# Patient Record
Sex: FEMALE | Race: WHITE
[De-identification: names, ages, dates, MRNs, and addresses within clinical notes are randomized per-mention and may not be internally consistent; named-entity substitution may affect disease eponyms.]

---

## 2018-06-19 ENCOUNTER — HOSPITAL ENCOUNTER (EMERGENCY)
Dept: HOSPITAL 80 - FED | Age: 28
Discharge: HOME | End: 2018-06-19
Payer: COMMERCIAL

## 2018-06-19 VITALS — DIASTOLIC BLOOD PRESSURE: 61 MMHG | SYSTOLIC BLOOD PRESSURE: 106 MMHG

## 2018-06-19 DIAGNOSIS — Z91.040: ICD-10-CM

## 2018-06-19 DIAGNOSIS — G44.201: Primary | ICD-10-CM

## 2018-06-19 LAB — PLATELET # BLD: 278 10^3/UL (ref 150–400)

## 2018-06-19 NOTE — EDPHY
H & P


Stated Complaint: ha > 1 week/hx tension ha's


Time Seen by Provider: 06/19/18 15:12


HPI/ROS: 





CHIEF COMPLAINT:  Persistent headache





HISTORY OF PRESENT ILLNESS:  27-year-old female presents with a one-week 

history of persistent headache.  Onset of typical tension-type headache 1 week 

ago.  The pain started in her upper back and neck and then migrated to the back 

of her skull.  The headache has been persistent despite ibuprofen, Naprosyn and 

Tylenol.  She saw her PCP and was given Toradol IM with some relief.  She was 

sent here for CT scan.  h/o frequent tension HA's, similar in quality to this HA

, but never lasting this long.    Recent stressors at work.





REVIEW OF SYSTEMS:  complete 10 point ROS negative except at noted in the HPI








- Personal History


LMP (Females 10-55): Over 28 Days Ago


Current Tetanus Diphtheria and Acellular Pertussis (TDAP): Yes





- Medical/Surgical History


Hx Asthma: No


Hx Chronic Respiratory Disease: No


Hx Diabetes: No


Hx Cardiac Disease: No


Hx Renal Disease: No


Hx Cirrhosis: No


Hx Alcoholism: No


Hx HIV/AIDS: No


Hx Splenectomy or Spleen Trauma: No


Other PMH: ha





- Social History


Smoking Status: Never smoked


Alcohol Use: Occasionally


Drug Use: None


Additional Social History: 











- Physical Exam


Exam: 





General Appearance:  Alert, pleasant


Eyes:  Pupils equal and round, no conjunctival pallor or injection


ENT, Mouth:  Mucous membranes moist


Neck:  Normal inspection


Respiratory:  Lungs are clear to auscultation


Cardiovascular:  Regular rate and rhythm


Gastrointestinal:  Abdomen is soft and nontender


Neurological:  Alert, oriented x3, cranial nerves II through XII intact, motor 5

/5, sensory intact to light touch, normal gait


Skin:  Warm and dry, no rash


Extremities:  Nontender, no pedal edema


Psychiatric:  Mood and affect normal





Constitutional: 


 Initial Vital Signs











Temperature (C)  36.8 C   06/19/18 12:55


 


Heart Rate  68   06/19/18 12:55


 


Respiratory Rate  18   06/19/18 12:55


 


Blood Pressure  102/65   06/19/18 12:55


 


O2 Sat (%)  98   06/19/18 12:55








 











O2 Delivery Mode               Room Air














Allergies/Adverse Reactions: 


 





latex sensitive Allergy (Uncoded 06/19/18 12:58)


 








Home Medications: 














 Medication  Instructions  Recorded


 


CLONAZEPAM  06/19/18


 


Namenda 10 mg  06/19/18


 


Nuva Ring  06/19/18


 


Propranolol HCl  06/19/18


 


traZODone  06/19/18














Medical Decision Making





- Diagnostics


Imaging Results: 


 Imaging Impressions





Head CT  06/19/18 15:25


Impression:


 


No evidence of acute intracranial pathology.


 


Findings were communicated by telephone with Dr. MARTINEZ MARI at  6/19/2018 16

:17











ED Course/Re-evaluation: 





This patient presents with gradual onset of head and neck pain.  No relief with 

over-the-counter medications.  Neurologic exam is normal.  The clinical history 

does not suspect a serious etiology for the headache.  Specifically, I do not 

suspect meningitis or subarachnoid hemorrhage.  A CT scan of the brain ordered 

at her physician's request and is unremarkable.  Decadron, Benadryl and Reglan 

IV given.  The patient developed restless legs after IV Reglan.  Ativan 0.5 mg 

IV and Benadryl 12.5 mg IV given.  After this, she was no longer restless.  

Options discussed with the patient, including further medications or testing.  

She feels better and wishes to go home.  She appears comfortable and neurologic 

exam remains normal.  She will follow up with a neurologist as an outpatient.


Differential Diagnosis: 





Headache including but not limited to subarachnoid hemorrhage, migraine headache

, tension headache and infectious causes such as meningitis, pharyngitis and 

sinusitis.





- Data Points


Laboratory Results: 


 Laboratory Results





 06/19/18 15:32 





 06/19/18 15:32 





 











  06/19/18 06/19/18 06/19/18





  15:32 15:32 15:32


 


WBC      6.07 10^3/uL 10^3/uL





     (3.80-9.50) 


 


RBC      4.33 10^6/uL 10^6/uL





     (4.18-5.33) 


 


Hgb      13.7 g/dL g/dL





     (12.6-16.3) 


 


Hct      39.8 % %





     (38.0-47.0) 


 


MCV      91.9 fL fL





     (81.5-99.8) 


 


MCH      31.6 pg pg





     (27.9-34.1) 


 


MCHC      34.4 g/dL g/dL





     (32.4-36.7) 


 


RDW      12.8 % %





     (11.5-15.2) 


 


Plt Count      278 10^3/uL 10^3/uL





     (150-400) 


 


MPV      8.9 fL fL





     (8.7-11.7) 


 


Neut % (Auto)      55.5 % %





     (39.3-74.2) 


 


Lymph % (Auto)      33.3 % %





     (15.0-45.0) 


 


Mono % (Auto)      8.2 % %





     (4.5-13.0) 


 


Eos % (Auto)      2.3 % %





     (0.6-7.6) 


 


Baso % (Auto)      0.5 % %





     (0.3-1.7) 


 


Nucleat RBC Rel Count      0.0 % %





     (0.0-0.2) 


 


Absolute Neuts (auto)      3.37 10^3/uL 10^3/uL





     (1.70-6.50) 


 


Absolute Lymphs (auto)      2.02 10^3/uL 10^3/uL





     (1.00-3.00) 


 


Absolute Monos (auto)      0.50 10^3/uL 10^3/uL





     (0.30-0.80) 


 


Absolute Eos (auto)      0.14 10^3/uL 10^3/uL





     (0.03-0.40) 


 


Absolute Basos (auto)      0.03 10^3/uL 10^3/uL





     (0.02-0.10) 


 


Absolute Nucleated RBC      0.00 10^3/uL 10^3/uL





     (0-0.01) 


 


Immature Gran %      0.2 % %





     (0.0-1.1) 


 


Immature Gran #      0.01 10^3/uL 10^3/uL





     (0.00-0.10) 


 


Sodium    138 mEq/L mEq/L  





    (135-145)  


 


Potassium    4.0 mEq/L mEq/L  





    (3.3-5.0)  


 


Chloride    104 mEq/L mEq/L  





    ()  


 


Carbon Dioxide    17 mEq/l L mEq/l  





    (22-31)  


 


Anion Gap    17 mEq/L H mEq/L  





    (8-16)  


 


BUN    13 mg/dL mg/dL  





    (7-23)  


 


Creatinine    0.6 mg/dL mg/dL  





    (0.6-1.0)  


 


Estimated GFR    > 60   





    


 


Glucose    66 mg/dL L mg/dL  





    ()  


 


Calcium    9.4 mg/dL mg/dL  





    (8.5-10.4)  


 


Beta HCG, Qual  NEGATIVE     





    











Medications Given: 


 








Discontinued Medications





Dexamethasone (Decadron Injection)  10 mg IVP EDNOW ONE


   Stop: 06/19/18 15:26


   Last Admin: 06/19/18 15:34 Dose:  10 mg


Diphenhydramine HCl (Benadryl Injection)  25 mg IVP EDNOW ONE


   Stop: 06/19/18 15:26


   Last Admin: 06/19/18 15:35 Dose:  25 mg


Diphenhydramine HCl (Benadryl Injection)  12.5 mg IVP EDNOW ONE


   Stop: 06/19/18 16:20


   Last Admin: 06/19/18 16:20 Dose:  12.5 mg


Ketorolac Tromethamine (Toradol)  30 mg IVP EDNOW ONE


   Stop: 06/19/18 17:25


   Last Admin: 06/19/18 17:56 Dose:  Not Given


Lorazepam (Ativan Injection)  0.5 mg IVP EDNOW ONE


   Stop: 06/19/18 15:50


   Last Admin: 06/19/18 15:51 Dose:  0.5 mg


Metoclopramide HCl (Reglan Injection)  10 mg IVP EDNOW ONE


   Stop: 06/19/18 15:26


   Last Admin: 06/19/18 15:34 Dose:  10 mg








Departure





- Departure


Disposition: Home, Routine, Self-Care


Clinical Impression: 


Headache


Qualifiers:


 Headache type: tension-type Headache chronicity pattern: acute headache 

Intractability: intractable Qualified Code(s): G44.201 - Tension-type headache, 

unspecified, intractable





Condition: Good


Instructions:  Tension Headache (ED)


Referrals: 


Marc Moffett DO [Medical Doctor] - As per Instructions (Call to make an 

appointment.)

## 2019-06-24 ENCOUNTER — HOSPITAL ENCOUNTER (OUTPATIENT)
Dept: HOSPITAL 80 - BMCIMAGING | Age: 29
End: 2019-06-24
Payer: COMMERCIAL